# Patient Record
Sex: FEMALE | Race: WHITE | NOT HISPANIC OR LATINO | Employment: STUDENT | URBAN - METROPOLITAN AREA
[De-identification: names, ages, dates, MRNs, and addresses within clinical notes are randomized per-mention and may not be internally consistent; named-entity substitution may affect disease eponyms.]

---
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
## 2022-01-16 ENCOUNTER — HOSPITAL ENCOUNTER (EMERGENCY)
Facility: HOSPITAL | Age: 22
Discharge: HOME | End: 2022-01-16
Attending: EMERGENCY MEDICINE
Payer: MEDICAID

## 2022-01-16 VITALS
WEIGHT: 224.88 LBS | BODY MASS INDEX: 38.6 KG/M2 | DIASTOLIC BLOOD PRESSURE: 73 MMHG | OXYGEN SATURATION: 98 % | SYSTOLIC BLOOD PRESSURE: 125 MMHG | TEMPERATURE: 100 F | RESPIRATION RATE: 21 BRPM | HEART RATE: 116 BPM

## 2022-01-16 DIAGNOSIS — U07.1 COVID-19 VIRUS INFECTION: Primary | ICD-10-CM

## 2022-01-16 LAB
ALBUMIN SERPL BCP-MCNC: 3.6 G/DL (ref 3.5–5.2)
ALP SERPL-CCNC: 54 U/L (ref 55–135)
ALT SERPL W/O P-5'-P-CCNC: 16 U/L (ref 10–44)
ANION GAP SERPL CALC-SCNC: 12 MMOL/L (ref 8–16)
AST SERPL-CCNC: 18 U/L (ref 10–40)
BASOPHILS # BLD AUTO: 0.04 K/UL (ref 0–0.2)
BASOPHILS NFR BLD: 0.6 % (ref 0–1.9)
BILIRUB SERPL-MCNC: 0.3 MG/DL (ref 0.1–1)
BUN SERPL-MCNC: 9 MG/DL (ref 6–20)
CALCIUM SERPL-MCNC: 9.2 MG/DL (ref 8.7–10.5)
CHLORIDE SERPL-SCNC: 103 MMOL/L (ref 95–110)
CK SERPL-CCNC: 100 U/L (ref 20–180)
CO2 SERPL-SCNC: 22 MMOL/L (ref 23–29)
CREAT SERPL-MCNC: 1 MG/DL (ref 0.5–1.4)
CRP SERPL-MCNC: 28.7 MG/L (ref 0–8.2)
CTP QC/QA: YES
DIFFERENTIAL METHOD: NORMAL
EOSINOPHIL # BLD AUTO: 0.1 K/UL (ref 0–0.5)
EOSINOPHIL NFR BLD: 1.9 % (ref 0–8)
ERYTHROCYTE [DISTWIDTH] IN BLOOD BY AUTOMATED COUNT: 11.6 % (ref 11.5–14.5)
EST. GFR  (AFRICAN AMERICAN): >60 ML/MIN/1.73 M^2
EST. GFR  (NON AFRICAN AMERICAN): >60 ML/MIN/1.73 M^2
FERRITIN SERPL-MCNC: 44 NG/ML (ref 20–300)
GLUCOSE SERPL-MCNC: 113 MG/DL (ref 70–110)
HCT VFR BLD AUTO: 41.1 % (ref 37–48.5)
HGB BLD-MCNC: 13.9 G/DL (ref 12–16)
IMM GRANULOCYTES # BLD AUTO: 0.02 K/UL (ref 0–0.04)
IMM GRANULOCYTES NFR BLD AUTO: 0.3 % (ref 0–0.5)
LACTATE SERPL-SCNC: 1.1 MMOL/L (ref 0.5–2.2)
LDH SERPL L TO P-CCNC: 177 U/L (ref 110–260)
LYMPHOCYTES # BLD AUTO: 1.4 K/UL (ref 1–4.8)
LYMPHOCYTES NFR BLD: 18.9 % (ref 18–48)
MCH RBC QN AUTO: 29.2 PG (ref 27–31)
MCHC RBC AUTO-ENTMCNC: 33.8 G/DL (ref 32–36)
MCV RBC AUTO: 86 FL (ref 82–98)
MONOCYTES # BLD AUTO: 1 K/UL (ref 0.3–1)
MONOCYTES NFR BLD: 14.1 % (ref 4–15)
NEUTROPHILS # BLD AUTO: 4.6 K/UL (ref 1.8–7.7)
NEUTROPHILS NFR BLD: 64.2 % (ref 38–73)
NRBC BLD-RTO: 0 /100 WBC
PLATELET # BLD AUTO: 340 K/UL (ref 150–450)
PMV BLD AUTO: 9.7 FL (ref 9.2–12.9)
POTASSIUM SERPL-SCNC: 3.6 MMOL/L (ref 3.5–5.1)
PROCALCITONIN SERPL IA-MCNC: 0.05 NG/ML
PROT SERPL-MCNC: 7.3 G/DL (ref 6–8.4)
RBC # BLD AUTO: 4.76 M/UL (ref 4–5.4)
SARS-COV-2 RDRP RESP QL NAA+PROBE: POSITIVE
SODIUM SERPL-SCNC: 137 MMOL/L (ref 136–145)
TROPONIN I SERPL DL<=0.01 NG/ML-MCNC: <0.006 NG/ML (ref 0–0.03)
WBC # BLD AUTO: 7.23 K/UL (ref 3.9–12.7)

## 2022-01-16 PROCEDURE — 82728 ASSAY OF FERRITIN: CPT | Performed by: EMERGENCY MEDICINE

## 2022-01-16 PROCEDURE — 84484 ASSAY OF TROPONIN QUANT: CPT | Mod: ER | Performed by: EMERGENCY MEDICINE

## 2022-01-16 PROCEDURE — 25000003 PHARM REV CODE 250: Mod: ER | Performed by: EMERGENCY MEDICINE

## 2022-01-16 PROCEDURE — 86140 C-REACTIVE PROTEIN: CPT | Mod: ER | Performed by: EMERGENCY MEDICINE

## 2022-01-16 PROCEDURE — 99284 EMERGENCY DEPT VISIT MOD MDM: CPT | Mod: 25,ER

## 2022-01-16 PROCEDURE — 85025 COMPLETE CBC W/AUTO DIFF WBC: CPT | Mod: ER | Performed by: EMERGENCY MEDICINE

## 2022-01-16 PROCEDURE — 96360 HYDRATION IV INFUSION INIT: CPT | Mod: ER

## 2022-01-16 PROCEDURE — 84145 PROCALCITONIN (PCT): CPT | Mod: ER | Performed by: EMERGENCY MEDICINE

## 2022-01-16 PROCEDURE — 83615 LACTATE (LD) (LDH) ENZYME: CPT | Mod: ER | Performed by: EMERGENCY MEDICINE

## 2022-01-16 PROCEDURE — U0002 COVID-19 LAB TEST NON-CDC: HCPCS | Mod: ER | Performed by: EMERGENCY MEDICINE

## 2022-01-16 PROCEDURE — 80053 COMPREHEN METABOLIC PANEL: CPT | Mod: ER | Performed by: EMERGENCY MEDICINE

## 2022-01-16 PROCEDURE — 82550 ASSAY OF CK (CPK): CPT | Mod: ER | Performed by: EMERGENCY MEDICINE

## 2022-01-16 PROCEDURE — 83605 ASSAY OF LACTIC ACID: CPT | Mod: ER | Performed by: EMERGENCY MEDICINE

## 2022-01-16 RX ORDER — ACETAMINOPHEN 325 MG/1
650 TABLET ORAL
Status: COMPLETED | OUTPATIENT
Start: 2022-01-16 | End: 2022-01-16

## 2022-01-16 RX ADMIN — ACETAMINOPHEN 650 MG: 325 TABLET ORAL at 01:01

## 2022-01-16 RX ADMIN — SODIUM CHLORIDE 500 ML: 0.9 INJECTION, SOLUTION INTRAVENOUS at 12:01

## 2022-01-16 NOTE — Clinical Note
"Keenan "Skinny Marvin was seen and treated in our emergency department on 1/16/2022.  She may return to work on 01/24/2022.       If you have any questions or concerns, please don't hesitate to call.      Luis Regalado MD"

## 2022-01-16 NOTE — ED PROVIDER NOTES
Encounter Date: 1/16/2022       History     Chief Complaint   Patient presents with    COVID-19 Concerns     States headache, coughing, runny nose, fever 99, body aches     Chief complaint: Fever body aches    History of present illness:  21-year-old female presents with an onset today of body aches, runny nose, cough, headache and fever 99. Patient states she was well yesterday.  Went to work today and took some Mucinex XD with minimal relief.  Symptom severity is moderate at present.  Denies any shortness of breath.  States she has a very nonproductive cough.  No aggravating or mitigating factors.  No known exposure to COVID-19 or influenza.  No complaints of dysuria.  No complaints of nausea vomiting or diarrhea.  Denies any loss of taste or smell.          Review of patient's allergies indicates:   Allergen Reactions    Advair diskus [fluticasone propion-salmeterol]      Past Medical History:   Diagnosis Date    ADHD (attention deficit hyperactivity disorder)     Asthma     ODD (oppositional defiant disorder)      Past Surgical History:   Procedure Laterality Date    TYMPANOSTOMY TUBE PLACEMENT       History reviewed. No pertinent family history.  Social History     Tobacco Use    Smoking status: Never Smoker    Smokeless tobacco: Never Used   Substance Use Topics    Alcohol use: Yes     Comment: occas    Drug use: No     Review of Systems   Constitutional: Positive for chills and fever. Negative for activity change, appetite change and diaphoresis.   HENT: Positive for congestion, rhinorrhea and sore throat.    Eyes: Negative.  Negative for photophobia.   Respiratory: Positive for cough. Negative for shortness of breath.    Cardiovascular: Negative for chest pain and palpitations.   Gastrointestinal: Negative for abdominal pain, nausea and vomiting.   Genitourinary: Negative.  Negative for dysuria and flank pain.   Musculoskeletal: Positive for myalgias. Negative for arthralgias, back pain, gait  problem, neck pain and neck stiffness.   Skin: Negative.    Neurological: Negative.    Psychiatric/Behavioral: Negative.    All other systems reviewed and are negative.      Physical Exam     Initial Vitals [01/16/22 0005]   BP Pulse Resp Temp SpO2   (!) 142/81 (!) 133 (!) 22 (!) 100.6 °F (38.1 °C) 97 %      MAP       --         Physical Exam    Nursing note and vitals reviewed.  Constitutional: She appears well-developed and well-nourished. No distress.   HENT:   Head: Normocephalic and atraumatic.   Right Ear: External ear normal.   Left Ear: External ear normal.   Nose: Nose normal.   Mouth/Throat: Oropharynx is clear and moist.   Eyes: Conjunctivae and EOM are normal. No scleral icterus.   Neck: Neck supple.   Normal range of motion.  Cardiovascular: Regular rhythm and intact distal pulses.   Tachycardia rate 128   Pulmonary/Chest: Breath sounds normal. No respiratory distress. She has no wheezes. She has no rales.   Abdominal: Abdomen is soft. She exhibits no distension. There is no abdominal tenderness.   Musculoskeletal:         General: Normal range of motion.      Cervical back: Normal range of motion and neck supple.     Neurological: She is alert and oriented to person, place, and time. She has normal strength. GCS score is 15. GCS eye subscore is 4. GCS verbal subscore is 5. GCS motor subscore is 6.   Skin: Skin is warm and dry. Capillary refill takes less than 2 seconds.   Psychiatric: She has a normal mood and affect. Her behavior is normal.         ED Course   Procedures  Labs Reviewed   COMPREHENSIVE METABOLIC PANEL - Abnormal; Notable for the following components:       Result Value    CO2 22 (*)     Glucose 113 (*)     Alkaline Phosphatase 54 (*)     All other components within normal limits   C-REACTIVE PROTEIN - Abnormal; Notable for the following components:    CRP 28.7 (*)     All other components within normal limits   SARS-COV-2 RDRP GENE - Abnormal; Notable for the following components:     POC Rapid COVID Positive (*)     All other components within normal limits    Narrative:     This test utilizes isothermal nucleic acid amplification   technology to detect the SARS-CoV-2 RdRp nucleic acid segment.   The analytical sensitivity (limit of detection) is 125 genome   equivalents/mL.   A POSITIVE result implies infection with the SARS-CoV-2 virus;   the patient is presumed to be contagious.     A NEGATIVE result means that SARS-CoV-2 nucleic acids are not   present above the limit of detection. A NEGATIVE result should be   treated as presumptive. It does not rule out the possibility of   COVID-19 and should not be the sole basis for treatment decisions.   If COVID-19 is strongly suspected based on clinical and exposure   history, re-testing using an alternate molecular assay should be   considered.   This test is only for use under the Food and Drug   Administration s Emergency Use Authorization (EUA).   Commercial kits are provided by [employer].   Performance characteristics of the EUA have been independently   verified by [facility] Department of   Pathology and Laboratory Medicine.   _________________________________________________________________   The authorized Fact Sheet for Healthcare Providers and the authorized Fact   Sheet for Patients of the ID NOW COVID-19 are available on the FDA   website:     https://www.fda.gov/media/353530/download  https://www.fda.gov/media/036447/download       CBC W/ AUTO DIFFERENTIAL   LACTATE DEHYDROGENASE   CK   LACTIC ACID, PLASMA   TROPONIN I   FERRITIN   PROCALCITONIN            Imaging Results          X-Ray Chest AP Portable (Final result)  Result time 01/16/22 07:08:28    Final result by Miguel Angel Tomas MD (01/16/22 07:08:28)                 Impression:      No acute abnormality.      Electronically signed by: Miguel Angel Tomas  Date:    01/16/2022  Time:    07:08             Narrative:    EXAMINATION:  XR CHEST AP PORTABLE    CLINICAL  HISTORY:  COVID-19;    TECHNIQUE:  Single frontal view of the chest was performed.    COMPARISON:  None    FINDINGS:  External cardiac leads overlie the chest peerthe lungs are clear, with normal appearance of pulmonary vasculature and no pleural effusion or pneumothorax.    The cardiac silhouette is normal in size. The hilar and mediastinal contours are unremarkable.    Bones are intact.                                   Medications   sodium chloride 0.9% bolus 500 mL (has no administration in time range)     Medical Decision Making:   Clinical Tests:   Lab Tests: Ordered and Reviewed  Radiological Study: Ordered and Reviewed  ED Management:  Pt with good response to IV hydration and temp reduction.Advised of +Covid findings and need to follow instructions provided to her for quarantining and subsequent contuance of masking and social distancing. She understands                      Clinical Impression:        ICD-10-CM ICD-9-CM   1. COVID-19 virus infection  U07.1 079.89               Luis Regalado MD  01/16/22 1127

## 2022-08-31 ENCOUNTER — PATIENT MESSAGE (OUTPATIENT)
Dept: PRIMARY CARE CLINIC | Facility: CLINIC | Age: 22
End: 2022-08-31
Payer: MEDICAID

## 2022-09-26 ENCOUNTER — OFFICE VISIT (OUTPATIENT)
Dept: PRIMARY CARE CLINIC | Facility: CLINIC | Age: 22
End: 2022-09-26
Payer: MEDICAID

## 2022-09-26 VITALS
OXYGEN SATURATION: 100 % | DIASTOLIC BLOOD PRESSURE: 64 MMHG | HEART RATE: 96 BPM | WEIGHT: 225.19 LBS | BODY MASS INDEX: 38.44 KG/M2 | SYSTOLIC BLOOD PRESSURE: 118 MMHG | TEMPERATURE: 99 F | HEIGHT: 64 IN

## 2022-09-26 DIAGNOSIS — T78.2XXD ANAPHYLAXIS, SUBSEQUENT ENCOUNTER: ICD-10-CM

## 2022-09-26 DIAGNOSIS — L50.9 URTICARIA: Primary | ICD-10-CM

## 2022-09-26 PROCEDURE — 99204 PR OFFICE/OUTPT VISIT, NEW, LEVL IV, 45-59 MIN: ICD-10-PCS | Mod: S$PBB,,, | Performed by: FAMILY MEDICINE

## 2022-09-26 PROCEDURE — 3078F DIAST BP <80 MM HG: CPT | Mod: CPTII,,, | Performed by: FAMILY MEDICINE

## 2022-09-26 PROCEDURE — 3074F PR MOST RECENT SYSTOLIC BLOOD PRESSURE < 130 MM HG: ICD-10-PCS | Mod: CPTII,,, | Performed by: FAMILY MEDICINE

## 2022-09-26 PROCEDURE — 99214 OFFICE O/P EST MOD 30 MIN: CPT | Mod: PBBFAC,PN | Performed by: FAMILY MEDICINE

## 2022-09-26 PROCEDURE — 1159F MED LIST DOCD IN RCRD: CPT | Mod: CPTII,,, | Performed by: FAMILY MEDICINE

## 2022-09-26 PROCEDURE — 3008F BODY MASS INDEX DOCD: CPT | Mod: CPTII,,, | Performed by: FAMILY MEDICINE

## 2022-09-26 PROCEDURE — 3074F SYST BP LT 130 MM HG: CPT | Mod: CPTII,,, | Performed by: FAMILY MEDICINE

## 2022-09-26 PROCEDURE — 1160F RVW MEDS BY RX/DR IN RCRD: CPT | Mod: CPTII,,, | Performed by: FAMILY MEDICINE

## 2022-09-26 PROCEDURE — 99999 PR PBB SHADOW E&M-EST. PATIENT-LVL IV: ICD-10-PCS | Mod: PBBFAC,,, | Performed by: FAMILY MEDICINE

## 2022-09-26 PROCEDURE — 1160F PR REVIEW ALL MEDS BY PRESCRIBER/CLIN PHARMACIST DOCUMENTED: ICD-10-PCS | Mod: CPTII,,, | Performed by: FAMILY MEDICINE

## 2022-09-26 PROCEDURE — 1159F PR MEDICATION LIST DOCUMENTED IN MEDICAL RECORD: ICD-10-PCS | Mod: CPTII,,, | Performed by: FAMILY MEDICINE

## 2022-09-26 PROCEDURE — 99204 OFFICE O/P NEW MOD 45 MIN: CPT | Mod: S$PBB,,, | Performed by: FAMILY MEDICINE

## 2022-09-26 PROCEDURE — 3078F PR MOST RECENT DIASTOLIC BLOOD PRESSURE < 80 MM HG: ICD-10-PCS | Mod: CPTII,,, | Performed by: FAMILY MEDICINE

## 2022-09-26 PROCEDURE — 3008F PR BODY MASS INDEX (BMI) DOCUMENTED: ICD-10-PCS | Mod: CPTII,,, | Performed by: FAMILY MEDICINE

## 2022-09-26 PROCEDURE — 99999 PR PBB SHADOW E&M-EST. PATIENT-LVL IV: CPT | Mod: PBBFAC,,, | Performed by: FAMILY MEDICINE

## 2022-09-26 RX ORDER — FAMOTIDINE 20 MG/1
20 TABLET, FILM COATED ORAL 2 TIMES DAILY PRN
Qty: 30 TABLET | Refills: 0 | Status: SHIPPED | OUTPATIENT
Start: 2022-09-26 | End: 2023-01-30

## 2022-09-26 RX ORDER — EPINEPHRINE 0.3 MG/.3ML
1 INJECTION SUBCUTANEOUS ONCE
Qty: 2 EACH | Refills: 0 | Status: SHIPPED | OUTPATIENT
Start: 2022-09-26 | End: 2022-09-26

## 2022-09-26 RX ORDER — HYDROXYZINE HYDROCHLORIDE 25 MG/1
25 TABLET, FILM COATED ORAL EVERY 6 HOURS PRN
Qty: 30 TABLET | Refills: 0 | Status: SHIPPED | OUTPATIENT
Start: 2022-09-26 | End: 2022-10-26

## 2022-09-26 RX ORDER — CETIRIZINE HYDROCHLORIDE 10 MG/1
10 TABLET ORAL DAILY
Qty: 30 TABLET | Refills: 0 | Status: SHIPPED | OUTPATIENT
Start: 2022-09-26 | End: 2023-01-30

## 2022-09-26 NOTE — PROGRESS NOTES
Subjective:       Patient ID: Keenan Marvin is a 22 y.o. female.    Chief Complaint: Establish Care (Allergic reaction to foods /est care )      HPI   History of Present Illness:   Keenan Marvin 22 y.o. female presents today with  Here with grandmother  History of Present Illness: The patient presents with chief complaint of urticaria  Sxs include, lips swelling, hoarseness, whelps to arms, back and face  Duration: days, recurrent, sxs is present to the back today.  Thinks it is food related but unable to any particular food. Worsening frequency.  Treatment: Hydroxyzine from friend, benadryl.     Past Medical History:   Diagnosis Date    ADHD (attention deficit hyperactivity disorder)     Asthma     ODD (oppositional defiant disorder)      History reviewed. No pertinent family history.  Social History     Socioeconomic History    Marital status: Single   Tobacco Use    Smoking status: Every Day     Types: Vaping with nicotine    Smokeless tobacco: Never   Substance and Sexual Activity    Alcohol use: Yes     Comment: occas    Drug use: No    Sexual activity: Yes     Partners: Male     Birth control/protection: OCP     Outpatient Encounter Medications as of 9/26/2022   Medication Sig Dispense Refill    desog-e.estradioL/e.estradioL (VIORELE, 28,) 0.15-0.02 mgx21 /0.01 mg x 5 per tablet Take 1 tablet by mouth once daily. 28 tablet 11    EPINEPHrine (EPIPEN 2-OLIVER) 0.3 mg/0.3 mL AtIn Inject 0.3 mLs (0.3 mg total) into the muscle once. for 1 dose 2 each 0    lisdexamfetamine dimesylate (VYVANSE ORAL) Vyvanse Take No date recorded No form recorded No frequency recorded No route recorded No set duration recorded No set duration amount recorded suspended No dosage strength recorded No dosage strength units of measure recorded      [DISCONTINUED] atomoxetine (STRATTERA) 40 MG capsule Take 40 mg by mouth once daily.      [DISCONTINUED] cetirizine 10 mg chewable tablet Take 10 mg by mouth once daily.       No  "facility-administered encounter medications on file as of 9/26/2022.       Review of Systems    Review of Systems      A complete 10 point ROS was completed and are positive as per above HPI.    Otherwise negative for fever, diplopia, chest pain, shortness of breath, vomiting, blood in urine, joint pain, seizures and unusual bleeding.       Objective:      /64 (BP Location: Left arm, Patient Position: Sitting, BP Method: Large (Manual))   Pulse 96   Temp 98.8 °F (37.1 °C) (Temporal)   Ht 5' 4" (1.626 m)   Wt 102.2 kg (225 lb 3.2 oz)   SpO2 100%   BMI 38.66 kg/m²   Physical Exam  Skin:     Findings: Rash present. Rash is urticarial.                Results for orders placed or performed during the hospital encounter of 01/16/22   CBC auto differential   Result Value Ref Range    WBC 7.23 3.90 - 12.70 K/uL    RBC 4.76 4.00 - 5.40 M/uL    Hemoglobin 13.9 12.0 - 16.0 g/dL    Hematocrit 41.1 37.0 - 48.5 %    MCV 86 82 - 98 fL    MCH 29.2 27.0 - 31.0 pg    MCHC 33.8 32.0 - 36.0 g/dL    RDW 11.6 11.5 - 14.5 %    Platelets 340 150 - 450 K/uL    MPV 9.7 9.2 - 12.9 fL    Immature Granulocytes 0.3 0.0 - 0.5 %    Gran # (ANC) 4.6 1.8 - 7.7 K/uL    Immature Grans (Abs) 0.02 0.00 - 0.04 K/uL    Lymph # 1.4 1.0 - 4.8 K/uL    Mono # 1.0 0.3 - 1.0 K/uL    Eos # 0.1 0.0 - 0.5 K/uL    Baso # 0.04 0.00 - 0.20 K/uL    nRBC 0 0 /100 WBC    Gran % 64.2 38.0 - 73.0 %    Lymph % 18.9 18.0 - 48.0 %    Mono % 14.1 4.0 - 15.0 %    Eosinophil % 1.9 0.0 - 8.0 %    Basophil % 0.6 0.0 - 1.9 %    Differential Method Automated    Comprehensive metabolic panel   Result Value Ref Range    Sodium 137 136 - 145 mmol/L    Potassium 3.6 3.5 - 5.1 mmol/L    Chloride 103 95 - 110 mmol/L    CO2 22 (L) 23 - 29 mmol/L    Glucose 113 (H) 70 - 110 mg/dL    BUN 9 6 - 20 mg/dL    Creatinine 1.0 0.5 - 1.4 mg/dL    Calcium 9.2 8.7 - 10.5 mg/dL    Total Protein 7.3 6.0 - 8.4 g/dL    Albumin 3.6 3.5 - 5.2 g/dL    Total Bilirubin 0.3 0.1 - 1.0 mg/dL    " Alkaline Phosphatase 54 (L) 55 - 135 U/L    AST 18 10 - 40 U/L    ALT 16 10 - 44 U/L    Anion Gap 12 8 - 16 mmol/L    eGFR if African American >60.0 >60 mL/min/1.73 m^2    eGFR if non African American >60.0 >60 mL/min/1.73 m^2   C-Reactive Protein   Result Value Ref Range    CRP 28.7 (H) 0.0 - 8.2 mg/L   Ferritin   Result Value Ref Range    Ferritin 44 20.0 - 300.0 ng/mL   Lactate Dehydrogenase   Result Value Ref Range     110 - 260 U/L   CK   Result Value Ref Range     20 - 180 U/L   Lactic Acid, Plasma   Result Value Ref Range    Lactate (Lactic Acid) 1.1 0.5 - 2.2 mmol/L   Troponin I   Result Value Ref Range    Troponin I <0.006 0.000 - 0.026 ng/mL   Procalcitonin   Result Value Ref Range    Procalcitonin 0.05 <0.25 ng/mL   POCT COVID-19 Rapid Screening   Result Value Ref Range    POC Rapid COVID Positive (A) Negative     Acceptable Yes      Assessment:       1. Urticaria    2. Anaphylaxis, subsequent encounter          Plan:   Urticaria  -     Ambulatory referral/consult to Allergy; Future; Expected date: 10/03/2022    Anaphylaxis, subsequent encounter  -     EPINEPHrine (EPIPEN 2-OLIVER) 0.3 mg/0.3 mL AtIn; Inject 0.3 mLs (0.3 mg total) into the muscle once. for 1 dose  Dispense: 2 each; Refill: 0    I have reviewed all of the patient's clinical history available in care everywhere and Epic and have utilized this in my evaluation and management recommendations today.   Urticaria, suspect hoarseness to be due to mucosal edema, etiology unknown but epi pen prescribed as a precaution. Instructed to inject if swelling of the tongue or diff breathing.    Treatment options and alternatives were discussed with the patient. Patient was given ample time to ask questions. All questions were answered. Voices understanding and acceptance of this advice. Will call back if any further questions or concerns.      Lida Carr MD

## 2024-09-06 ENCOUNTER — HOSPITAL ENCOUNTER (EMERGENCY)
Facility: HOSPITAL | Age: 24
Discharge: HOME | End: 2024-09-07
Attending: EMERGENCY MEDICINE
Payer: MEDICAID

## 2024-09-06 VITALS
HEIGHT: 63 IN | OXYGEN SATURATION: 100 % | BODY MASS INDEX: 40.69 KG/M2 | DIASTOLIC BLOOD PRESSURE: 85 MMHG | RESPIRATION RATE: 20 BRPM | TEMPERATURE: 98 F | HEART RATE: 92 BPM | WEIGHT: 229.63 LBS | SYSTOLIC BLOOD PRESSURE: 163 MMHG

## 2024-09-06 DIAGNOSIS — S39.012A BACK STRAIN, INITIAL ENCOUNTER: Primary | ICD-10-CM

## 2024-09-06 DIAGNOSIS — M62.830 BACK SPASM: ICD-10-CM

## 2024-09-06 DIAGNOSIS — R10.9 LEFT FLANK PAIN: ICD-10-CM

## 2024-09-06 LAB
ALBUMIN SERPL BCP-MCNC: 4 G/DL (ref 3.5–5.2)
ALP SERPL-CCNC: 61 U/L (ref 55–135)
ALT SERPL W/O P-5'-P-CCNC: 12 U/L (ref 10–44)
ANION GAP SERPL CALC-SCNC: 11 MMOL/L (ref 8–16)
AST SERPL-CCNC: 13 U/L (ref 10–40)
B-HCG UR QL: NEGATIVE
BACTERIA #/AREA URNS AUTO: ABNORMAL /HPF
BASOPHILS # BLD AUTO: 0.08 K/UL (ref 0–0.2)
BASOPHILS NFR BLD: 0.7 % (ref 0–1.9)
BILIRUB SERPL-MCNC: 0.3 MG/DL (ref 0.1–1)
BILIRUB UR QL STRIP: NEGATIVE
BUN SERPL-MCNC: 11 MG/DL (ref 6–20)
CALCIUM SERPL-MCNC: 9.4 MG/DL (ref 8.7–10.5)
CHLORIDE SERPL-SCNC: 106 MMOL/L (ref 95–110)
CLARITY UR REFRACT.AUTO: ABNORMAL
CO2 SERPL-SCNC: 23 MMOL/L (ref 23–29)
COLOR UR AUTO: YELLOW
CREAT SERPL-MCNC: 0.9 MG/DL (ref 0.5–1.4)
DIFFERENTIAL METHOD BLD: ABNORMAL
EOSINOPHIL # BLD AUTO: 0.1 K/UL (ref 0–0.5)
EOSINOPHIL NFR BLD: 0.9 % (ref 0–8)
ERYTHROCYTE [DISTWIDTH] IN BLOOD BY AUTOMATED COUNT: 12.3 % (ref 11.5–14.5)
EST. GFR  (NO RACE VARIABLE): >60 ML/MIN/1.73 M^2
GLUCOSE SERPL-MCNC: 97 MG/DL (ref 70–110)
GLUCOSE UR QL STRIP: NEGATIVE
HCT VFR BLD AUTO: 44.8 % (ref 37–48.5)
HGB BLD-MCNC: 15.4 G/DL (ref 12–16)
HGB UR QL STRIP: NEGATIVE
IMM GRANULOCYTES # BLD AUTO: 0.02 K/UL (ref 0–0.04)
IMM GRANULOCYTES NFR BLD AUTO: 0.2 % (ref 0–0.5)
KETONES UR QL STRIP: NEGATIVE
LEUKOCYTE ESTERASE UR QL STRIP: NEGATIVE
LIPASE SERPL-CCNC: 30 U/L (ref 4–60)
LYMPHOCYTES # BLD AUTO: 4 K/UL (ref 1–4.8)
LYMPHOCYTES NFR BLD: 34.6 % (ref 18–48)
MCH RBC QN AUTO: 29.3 PG (ref 27–31)
MCHC RBC AUTO-ENTMCNC: 34.4 G/DL (ref 32–36)
MCV RBC AUTO: 85 FL (ref 82–98)
MICROSCOPIC COMMENT: ABNORMAL
MONOCYTES # BLD AUTO: 0.7 K/UL (ref 0.3–1)
MONOCYTES NFR BLD: 6.1 % (ref 4–15)
NEUTROPHILS # BLD AUTO: 6.7 K/UL (ref 1.8–7.7)
NEUTROPHILS NFR BLD: 57.5 % (ref 38–73)
NITRITE UR QL STRIP: NEGATIVE
NRBC BLD-RTO: 0 /100 WBC
PH UR STRIP: 8 [PH] (ref 5–8)
PLATELET # BLD AUTO: 452 K/UL (ref 150–450)
PMV BLD AUTO: 9.8 FL (ref 9.2–12.9)
POTASSIUM SERPL-SCNC: 3.5 MMOL/L (ref 3.5–5.1)
PROT SERPL-MCNC: 7.7 G/DL (ref 6–8.4)
PROT UR QL STRIP: NEGATIVE
RBC # BLD AUTO: 5.26 M/UL (ref 4–5.4)
RBC #/AREA URNS AUTO: 1 /HPF (ref 0–4)
SODIUM SERPL-SCNC: 140 MMOL/L (ref 136–145)
SP GR UR STRIP: 1.02 (ref 1–1.03)
SQUAMOUS #/AREA URNS AUTO: 4 /HPF
URN SPEC COLLECT METH UR: ABNORMAL
UROBILINOGEN UR STRIP-ACNC: <2 EU/DL
WBC # BLD AUTO: 11.68 K/UL (ref 3.9–12.7)
WBC #/AREA URNS AUTO: 1 /HPF (ref 0–5)

## 2024-09-06 PROCEDURE — 81025 URINE PREGNANCY TEST: CPT | Mod: ER | Performed by: EMERGENCY MEDICINE

## 2024-09-06 PROCEDURE — 83690 ASSAY OF LIPASE: CPT | Mod: ER | Performed by: EMERGENCY MEDICINE

## 2024-09-06 PROCEDURE — 85025 COMPLETE CBC W/AUTO DIFF WBC: CPT | Mod: ER | Performed by: EMERGENCY MEDICINE

## 2024-09-06 PROCEDURE — 63600175 PHARM REV CODE 636 W HCPCS: Mod: ER | Performed by: EMERGENCY MEDICINE

## 2024-09-06 PROCEDURE — 96361 HYDRATE IV INFUSION ADD-ON: CPT | Mod: ER

## 2024-09-06 PROCEDURE — 80053 COMPREHEN METABOLIC PANEL: CPT | Mod: ER | Performed by: EMERGENCY MEDICINE

## 2024-09-06 PROCEDURE — 99285 EMERGENCY DEPT VISIT HI MDM: CPT | Mod: 25,ER

## 2024-09-06 PROCEDURE — 25000003 PHARM REV CODE 250: Mod: ER | Performed by: EMERGENCY MEDICINE

## 2024-09-06 PROCEDURE — 96374 THER/PROPH/DIAG INJ IV PUSH: CPT | Mod: ER

## 2024-09-06 PROCEDURE — 81000 URINALYSIS NONAUTO W/SCOPE: CPT | Mod: ER | Performed by: EMERGENCY MEDICINE

## 2024-09-06 RX ORDER — HYDROXYZINE PAMOATE 25 MG/1
25 CAPSULE ORAL
COMMUNITY
Start: 2024-06-01

## 2024-09-06 RX ORDER — METHOCARBAMOL 500 MG/1
500 TABLET, FILM COATED ORAL 3 TIMES DAILY
COMMUNITY
Start: 2024-09-06

## 2024-09-06 RX ORDER — DEXBROMPHENIRAMINE MALEATE, PHENYLEPHRINE HYDROCHLORIDE 2; 7.5 MG/1; MG/1
1 TABLET ORAL 3 TIMES DAILY
COMMUNITY
Start: 2024-04-11

## 2024-09-06 RX ORDER — HYDROCHLOROTHIAZIDE 25 MG/1
TABLET ORAL
COMMUNITY
Start: 2024-08-07

## 2024-09-06 RX ORDER — KETOROLAC TROMETHAMINE 30 MG/ML
30 INJECTION, SOLUTION INTRAMUSCULAR; INTRAVENOUS
Status: COMPLETED | OUTPATIENT
Start: 2024-09-06 | End: 2024-09-06

## 2024-09-06 RX ORDER — NAPROXEN 500 MG/1
500 TABLET ORAL 2 TIMES DAILY
COMMUNITY
Start: 2024-09-06

## 2024-09-06 RX ADMIN — SODIUM CHLORIDE 1000 ML: 9 INJECTION, SOLUTION INTRAVENOUS at 10:09

## 2024-09-06 RX ADMIN — KETOROLAC TROMETHAMINE 30 MG: 30 INJECTION, SOLUTION INTRAMUSCULAR at 10:09

## 2024-09-07 NOTE — ED PROVIDER NOTES
Encounter Date: 9/6/2024       History     Chief Complaint   Patient presents with    Flank Pain     C/o right sided flank pain that started 2 days ago w/ stabbing and tightness that started today. Denies nausea or vomiting. States burning w/ urination.      The history is provided by the patient.   Abdominal Pain  The current episode started today. The onset of the illness was gradual. The problem has not changed since onset.The abdominal pain is located in the left flank. The abdominal pain does not radiate. Pain scale: moderate. The abdominal pain is relieved by nothing. The abdominal pain is exacerbated by movement. The other symptoms of the illness do not include fever, fatigue, jaundice, melena, shortness of breath, nausea, vomiting, diarrhea, dysuria, hematemesis, hematochezia, vaginal discharge or vaginal bleeding.   The patient states that she believes she is currently not pregnant. The patient has not had a change in bowel habit. Additional symptoms associated with the illness include back pain (left lower back pain). Symptoms associated with the illness do not include chills, anorexia, diaphoresis, heartburn, constipation, urgency, hematuria or frequency.     Review of patient's allergies indicates:   Allergen Reactions    Advair diskus [fluticasone propion-salmeterol]      Past Medical History:   Diagnosis Date    ADHD (attention deficit hyperactivity disorder)     Asthma     ODD (oppositional defiant disorder)      Past Surgical History:   Procedure Laterality Date    TYMPANOSTOMY TUBE PLACEMENT      WISDOM TOOTH EXTRACTION       No family history on file.  Social History     Tobacco Use    Smoking status: Every Day     Types: Vaping with nicotine    Smokeless tobacco: Never   Substance Use Topics    Alcohol use: Yes     Comment: occas    Drug use: No     Review of Systems   Constitutional:  Negative for chills, diaphoresis, fatigue and fever.   HENT:  Negative for sore throat.    Respiratory:  Negative  for shortness of breath.    Cardiovascular:  Negative for chest pain.   Gastrointestinal:  Positive for abdominal pain. Negative for anorexia, constipation, diarrhea, heartburn, hematemesis, hematochezia, jaundice, melena, nausea and vomiting.   Genitourinary:  Negative for dysuria, frequency, hematuria, urgency, vaginal bleeding and vaginal discharge.   Musculoskeletal:  Positive for back pain (left lower back pain).   Skin:  Negative for rash.   Neurological:  Negative for weakness.   Hematological:  Does not bruise/bleed easily.   All other systems reviewed and are negative.      Physical Exam     Initial Vitals [09/06/24 2131]   BP Pulse Resp Temp SpO2   (!) 158/78 98 20 97.9 °F (36.6 °C) 98 %      MAP       --         Physical Exam    Nursing note and vitals reviewed.  Constitutional: She appears well-developed and well-nourished.   HENT:   Head: Normocephalic and atraumatic.   Mouth/Throat: No oropharyngeal exudate.   Eyes: Conjunctivae and EOM are normal. Pupils are equal, round, and reactive to light.   Neck: Neck supple. No thyromegaly present.   Normal range of motion.  Cardiovascular:  Normal rate, regular rhythm, normal heart sounds and intact distal pulses.     Exam reveals no gallop and no friction rub.       No murmur heard.  Pulmonary/Chest: Breath sounds normal. No respiratory distress. She has no wheezes. She has no rhonchi. She exhibits no tenderness.   Abdominal: Abdomen is soft. Bowel sounds are normal. She exhibits no distension. There is no abdominal tenderness. There is no rebound and no guarding.   Musculoskeletal:         General: No edema. Normal range of motion.      Right wrist: Normal. Normal pulse.      Left wrist: Normal. Normal pulse.      Right hand: Normal. Normal capillary refill. Normal pulse.      Left hand: Normal. Normal capillary refill. Normal pulse.      Cervical back: Normal, normal range of motion and neck supple.      Thoracic back: Normal.      Lumbar back: Spasms and  tenderness (left paraspinous mm ttp) present.        Back:      Lymphadenopathy:     She has no cervical adenopathy.   Neurological: She is alert and oriented to person, place, and time. She has normal strength. No cranial nerve deficit or sensory deficit. She displays a negative Romberg sign. GCS eye subscore is 4. GCS verbal subscore is 5. GCS motor subscore is 6.   No acute focal neuro deficits   Skin: Skin is warm, dry and intact. Capillary refill takes less than 2 seconds. No rash noted.   Psychiatric: She has a normal mood and affect. Her behavior is normal. Judgment and thought content normal.         ED Course   Procedures  Labs Reviewed   CBC W/ AUTO DIFFERENTIAL - Abnormal       Result Value    WBC 11.68      RBC 5.26      Hemoglobin 15.4      Hematocrit 44.8      MCV 85      MCH 29.3      MCHC 34.4      RDW 12.3      Platelets 452 (*)     MPV 9.8      Immature Granulocytes 0.2      Gran # (ANC) 6.7      Immature Grans (Abs) 0.02      Lymph # 4.0      Mono # 0.7      Eos # 0.1      Baso # 0.08      nRBC 0      Gran % 57.5      Lymph % 34.6      Mono % 6.1      Eosinophil % 0.9      Basophil % 0.7      Differential Method Automated     URINALYSIS, REFLEX TO URINE CULTURE - Abnormal    Specimen UA Urine, Clean Catch      Color, UA Yellow      Appearance, UA Cloudy (*)     pH, UA 8.0      Specific Gravity, UA 1.020      Protein, UA Negative      Glucose, UA Negative      Ketones, UA Negative      Bilirubin (UA) Negative      Occult Blood UA Negative      Nitrite, UA Negative      Urobilinogen, UA <2.0      Leukocytes, UA Negative      Narrative:     Specimen Source->Urine   URINALYSIS MICROSCOPIC - Abnormal    RBC, UA 1      WBC, UA 1      Bacteria Few (*)     Squam Epithel, UA 4      Microscopic Comment SEE COMMENT      Narrative:     Specimen Source->Urine   COMPREHENSIVE METABOLIC PANEL    Sodium 140      Potassium 3.5      Chloride 106      CO2 23      Glucose 97      BUN 11      Creatinine 0.9       Calcium 9.4      Total Protein 7.7      Albumin 4.0      Total Bilirubin 0.3      Alkaline Phosphatase 61      AST 13      ALT 12      eGFR >60.0      Anion Gap 11     LIPASE    Lipase 30     PREGNANCY TEST, URINE RAPID    Preg Test, Ur Negative      Narrative:     Specimen Source->Urine          Imaging Results              CT Renal Stone Study ABD Pelvis WO (Final result)  Result time 09/06/24 23:04:55      Final result by José Miguel Norris DO (09/06/24 23:04:55)                   Impression:     No acute findings.    All CT scans at [this location] are performed using dose modulation techniques as appropriate to a performed exam including the following:  Automated exposure control; adjustment of the mA and/or kV according to patient size (this includes techniques or standardized protocols for targeted exams where dose is matched to indication / reason for exam; i.e. extremities or head); use of iterative reconstruction technique.    Finalized on: 9/6/2024 11:04 PM By:  José Miguel Norris  [facility]# [id number]      2024-09-06 23:07:06.084    [facility]               Narrative:    EXAM: CT RENAL STONE STUDY ABD PELVIS WO    CLINICAL HISTORY: Flank pain    COMPARISON: None    TECHNIQUE: Standard thin-section axial images, with reformatted sagittal and coronal images.    FINDINGS: Lung bases are clear.    Within limits of a noncontrast CT, the liver, spleen, pancreas, adrenals and gallbladder are unremarkable.    Kidneys: No hydronephrosis, hydroureter, nephrolithiasis or ureteral stones.    No abdominal or retroperitoneal lymphadenopathy.  No ascites or fat stranding within the abdomen.  No dilatation of the large or small bowel.  No evidence for appendicitis.    Pelvis: Uterus and adnexal regions are unremarkable.  No pelvic free fluid, iliac chain or inguinal lymphadenopathy.    No concerning lytic or sclerotic bony lesions.                                         Medications   sodium chloride 0.9% bolus 1,000 mL 1,000 mL (1,000 mLs  "Intravenous New Bag 9/6/24 2222)   ketorolac injection 30 mg (30 mg Intravenous Given 9/6/24 2222)     Medical Decision Making  Amount and/or Complexity of Data Reviewed  Labs: ordered.  Radiology: ordered.    Risk  Prescription drug management.                  Vitals:    09/06/24 2131 09/06/24 2222 09/06/24 2252 09/06/24 2307   BP: (!) 158/78 (!) 142/77  (!) 163/85   Pulse: 98 91 86 92   Resp: 20 20 20    Temp: 97.9 °F (36.6 °C)      TempSrc: Oral      SpO2: 98% 100%  100%   Weight: 104.2 kg (229 lb 9.8 oz)      Height: 5' 3" (1.6 m)          Results for orders placed or performed during the hospital encounter of 09/06/24   CBC W/ AUTO DIFFERENTIAL   Result Value Ref Range    WBC 11.68 3.90 - 12.70 K/uL    RBC 5.26 4.00 - 5.40 M/uL    Hemoglobin 15.4 12.0 - 16.0 g/dL    Hematocrit 44.8 37.0 - 48.5 %    MCV 85 82 - 98 fL    MCH 29.3 27.0 - 31.0 pg    MCHC 34.4 32.0 - 36.0 g/dL    RDW 12.3 11.5 - 14.5 %    Platelets 452 (H) 150 - 450 K/uL    MPV 9.8 9.2 - 12.9 fL    Immature Granulocytes 0.2 0.0 - 0.5 %    Gran # (ANC) 6.7 1.8 - 7.7 K/uL    Immature Grans (Abs) 0.02 0.00 - 0.04 K/uL    Lymph # 4.0 1.0 - 4.8 K/uL    Mono # 0.7 0.3 - 1.0 K/uL    Eos # 0.1 0.0 - 0.5 K/uL    Baso # 0.08 0.00 - 0.20 K/uL    nRBC 0 0 /100 WBC    Gran % 57.5 38.0 - 73.0 %    Lymph % 34.6 18.0 - 48.0 %    Mono % 6.1 4.0 - 15.0 %    Eosinophil % 0.9 0.0 - 8.0 %    Basophil % 0.7 0.0 - 1.9 %    Differential Method Automated    Comp. Metabolic Panel   Result Value Ref Range    Sodium 140 136 - 145 mmol/L    Potassium 3.5 3.5 - 5.1 mmol/L    Chloride 106 95 - 110 mmol/L    CO2 23 23 - 29 mmol/L    Glucose 97 70 - 110 mg/dL    BUN 11 6 - 20 mg/dL    Creatinine 0.9 0.5 - 1.4 mg/dL    Calcium 9.4 8.7 - 10.5 mg/dL    Total Protein 7.7 6.0 - 8.4 g/dL    Albumin 4.0 3.5 - 5.2 g/dL    Total Bilirubin 0.3 0.1 - 1.0 mg/dL    Alkaline Phosphatase 61 55 - 135 U/L    AST 13 10 - 40 U/L    ALT 12 10 - 44 U/L    eGFR >60.0 >60 mL/min/1.73 m^2    Anion Gap 11 " 8 - 16 mmol/L   Lipase   Result Value Ref Range    Lipase 30 4 - 60 U/L   Urinalysis, Reflex to Urine Culture Urine, Clean Catch    Specimen: Urine   Result Value Ref Range    Specimen UA Urine, Clean Catch     Color, UA Yellow Yellow, Straw, Katina    Appearance, UA Cloudy (A) Clear    pH, UA 8.0 5.0 - 8.0    Specific Gravity, UA 1.020 1.005 - 1.030    Protein, UA Negative Negative    Glucose, UA Negative Negative    Ketones, UA Negative Negative    Bilirubin (UA) Negative Negative    Occult Blood UA Negative Negative    Nitrite, UA Negative Negative    Urobilinogen, UA <2.0 <2.0 EU/dL    Leukocytes, UA Negative Negative   Pregnancy, urine rapid   Result Value Ref Range    Preg Test, Ur Negative    Urinalysis Microscopic   Result Value Ref Range    RBC, UA 1 0 - 4 /hpf    WBC, UA 1 0 - 5 /hpf    Bacteria Few (A) None-Occ /hpf    Squam Epithel, UA 4 /hpf    Microscopic Comment SEE COMMENT          Imaging Results              CT Renal Stone Study ABD Pelvis WO (Final result)  Result time 09/06/24 23:04:55      Final result by José Miguel Norris DO (09/06/24 23:04:55)                   Impression:     No acute findings.    All CT scans at [this location] are performed using dose modulation techniques as appropriate to a performed exam including the following:  Automated exposure control; adjustment of the mA and/or kV according to patient size (this includes techniques or standardized protocols for targeted exams where dose is matched to indication / reason for exam; i.e. extremities or head); use of iterative reconstruction technique.    Finalized on: 9/6/2024 11:04 PM By:  José Miguel Norris  [facility]# [id number]      2024-09-06 23:07:06.084    [facility]               Narrative:    EXAM: CT RENAL STONE STUDY ABD PELVIS WO    CLINICAL HISTORY: Flank pain    COMPARISON: None    TECHNIQUE: Standard thin-section axial images, with reformatted sagittal and coronal images.    FINDINGS: Lung bases are clear.    Within limits of a noncontrast  CT, the liver, spleen, pancreas, adrenals and gallbladder are unremarkable.    Kidneys: No hydronephrosis, hydroureter, nephrolithiasis or ureteral stones.    No abdominal or retroperitoneal lymphadenopathy.  No ascites or fat stranding within the abdomen.  No dilatation of the large or small bowel.  No evidence for appendicitis.    Pelvis: Uterus and adnexal regions are unremarkable.  No pelvic free fluid, iliac chain or inguinal lymphadenopathy.    No concerning lytic or sclerotic bony lesions.                                        Medications   sodium chloride 0.9% bolus 1,000 mL 1,000 mL (1,000 mLs Intravenous New Bag 9/6/24 2222)   ketorolac injection 30 mg (30 mg Intravenous Given 9/6/24 2222)       11:44 PM - Re-evaluation: The patient is resting comfortably and is in no acute distress. She states that her symptoms have improved after treatment within ER. Discussed test results, shared treatment plan, specific conditions for return, and importance of follow up with patient and family.  Advised close f/u c pcp within one week and to return to ER if any issues arise.  She understands and agrees with the plan as discussed. Answered  her questions at this time. She has remained hemodynamically stable throughout the ED course and is appropriate for discharge home.     Take robaxin and naproxen as prescribed.  Return to ER if symptoms persist or worsen.    Regarding BACK PAIN, I advised patient to rest and slowly start to increase activity as pain decreases or as tolerable.  Also recommend the use of ice and heat. Explained to patient that ice will help decrease swelling and pain and prevent tissue damage (verbalized importance of covering ice with a towel and not applying it directly to skin and applying it for 20-30 minutes every 2 hours as needed). Further explained that heat will help decrease pain and muscle spasms (instructed patient to not fall asleep with heating pad and to apply heat to lower back for  20-30 minutes every 2 hours as needed). For prevention, I recommended that the patient use correct body movements (bend at the hips and knees when picking up objects and use leg muscles as you lift the load; keep objects close to chest when lifting it; and avoid twisting or lifting anything above the waist; change position often when standing for long periods of time; never reach, pull, or push while seated; exercise frequently and warm up before exercising; and maintain a healthy weight.  Follow up with primary care provider if no improvement is noticed in next three days.  Take all medications as prescribed and avoid operating heavy machinery or driving if prescribed pain medications or muscle relaxants.  Instructed patient to return to the emergency department if they develop incontinence, notice increased swelling or pain in lower back; begin to have difficulty moving lower extremities; or develop numbness to legs.     Driving or other activities under influence of medications - Patient and/or family/caretaker was given a prescription for, or instructed to use a medicine that may impair ability to drive, operate machinery, or participate in other potentially dangerous activities.  Patient was instructed not to participate in these activities while under the influence of these medications.      Pre-hypertension/Hypertension: The pt has been informed that they may have pre-hypertension or hypertension based on a blood pressure reading in the ED. I recommend that the pt call the PCP listed on their discharge instructions or a physician of their choice this week to arrange f/u for further evaluation of possible pre-hypertension or hypertension.     Keenan Marvin was given a handout which discussed their disease process, precautions, and instructions for follow-up and therapy.     Follow-up Information       Yennifer Lindo MD. Schedule an appointment as soon as possible for a visit in 1 week.    Specialty:  Pediatrics  Contact information:  [address] [facility] [address]  [place] [address]  [phone]               [facility] - Emergency Dept.    Specialty: Emergency Medicine  Why: As needed, If symptoms worsen  Contact information:  [address]  Emergency Department  [facility] [id number] [phone]                              Medication List        ASK your doctor about these medications      ALAHIST PE 2-7.5 mg Tab  Generic drug: dexbrompheniramine-phenyleph     drospirenone-ethinyl estradioL 3-0.03 mg per tablet  Commonly known as: DONTRELL  Take 1 tablet by mouth once daily.     EPINEPHrine 0.3 mg/0.3 mL Atin  Commonly known as: EPIPEN 2-OLIVER  Inject 0.3 mLs (0.3 mg total) into the muscle once. for 1 dose     hydroCHLOROthiazide 25 MG tablet  Commonly known as: HYDRODIURIL     hydrOXYzine pamoate 25 MG Cap  Commonly known as: VISTARIL     methocarbamoL 500 MG Tab  Commonly known as: ROBAXIN     naproxen 500 MG tablet  Commonly known as: NAPROSYN     ondansetron 8 MG Tbdl  Commonly known as: ZOFRAN-ODT  Take 1 tablet (8 mg total) by mouth every 8 (eight) hours as needed (nausea and vomiting).             Current Discharge Medication List            ED Diagnosis  1. Back strain, initial encounter    2. Left flank pain    3. Back spasm                            Clinical Impression:  Final diagnoses:  [R10.9] Left flank pain  [S39.012A] Back strain, initial encounter (Primary)  [M62.830] Back spasm          ED Disposition Condition    Discharge Stable          ED Prescriptions    None       Follow-up Information       Follow up With Specialties Details Why Contact Info    Yennifer Lindo MD Pediatrics Schedule an appointment as soon as possible for a visit in 1 week  [address] [facility] [address]  [place] [address]  [phone]      [facility] Emergency Dept Emergency Medicine  As needed, If symptoms worsen [address]  Emergency Department  [facility] [id number] [phone]              José Miguel Licona Jr., MD  09/06/24 0603

## 2024-09-07 NOTE — DISCHARGE INSTRUCTIONS
Take robaxin and naproxen as prescribed.  Return to ER if symptoms persist or worsen.    Regarding BACK PAIN, I advised patient to rest and slowly start to increase activity as pain decreases or as tolerable.  Also recommend the use of ice and heat. Explained to patient that ice will help decrease swelling and pain and prevent tissue damage (verbalized importance of covering ice with a towel and not applying it directly to skin and applying it for 20-30 minutes every 2 hours as needed). Further explained that heat will help decrease pain and muscle spasms (instructed patient to not fall asleep with heating pad and to apply heat to lower back for 20-30 minutes every 2 hours as needed). For prevention, I recommended that the patient use correct body movements (bend at the hips and knees when picking up objects and use leg muscles as you lift the load; keep objects close to chest when lifting it; and avoid twisting or lifting anything above the waist; change position often when standing for long periods of time; never reach, pull, or push while seated; exercise frequently and warm up before exercising; and maintain a healthy weight.  Follow up with primary care provider if no improvement is noticed in next three days.  Take all medications as prescribed and avoid operating heavy machinery or driving if prescribed pain medications or muscle relaxants.  Instructed patient to return to the emergency department if they develop incontinence, notice increased swelling or pain in lower back; begin to have difficulty moving lower extremities; or develop numbness to legs.     Driving or other activities under influence of medications - Patient and/or family/caretaker was given a prescription for, or instructed to use a medicine that may impair ability to drive, operate machinery, or participate in other potentially dangerous activities.  Patient was instructed not to participate in these activities while under the influence of  these medications.

## 2024-12-15 ENCOUNTER — HOSPITAL ENCOUNTER (EMERGENCY)
Facility: HOSPITAL | Age: 24
Discharge: HOME | End: 2024-12-15
Attending: EMERGENCY MEDICINE
Payer: COMMERCIAL

## 2024-12-15 VITALS
DIASTOLIC BLOOD PRESSURE: 72 MMHG | WEIGHT: 222.44 LBS | HEART RATE: 90 BPM | SYSTOLIC BLOOD PRESSURE: 142 MMHG | OXYGEN SATURATION: 100 % | HEIGHT: 63 IN | TEMPERATURE: 99 F | BODY MASS INDEX: 39.41 KG/M2 | RESPIRATION RATE: 20 BRPM

## 2024-12-15 DIAGNOSIS — R11.10 VOMITING, UNSPECIFIED VOMITING TYPE, UNSPECIFIED WHETHER NAUSEA PRESENT: ICD-10-CM

## 2024-12-15 DIAGNOSIS — R68.83 CHILLS: Primary | ICD-10-CM

## 2024-12-15 LAB
CTP QC/QA: YES
CTP QC/QA: YES
POC MOLECULAR INFLUENZA A AGN: NEGATIVE
POC MOLECULAR INFLUENZA B AGN: NEGATIVE
SARS-COV-2 RDRP RESP QL NAA+PROBE: NEGATIVE

## 2024-12-15 PROCEDURE — 99283 EMERGENCY DEPT VISIT LOW MDM: CPT | Mod: ER

## 2024-12-15 PROCEDURE — 87635 SARS-COV-2 COVID-19 AMP PRB: CPT | Mod: ER | Performed by: EMERGENCY MEDICINE

## 2024-12-15 PROCEDURE — 87502 INFLUENZA DNA AMP PROBE: CPT | Mod: ER

## 2024-12-15 RX ORDER — ONDANSETRON 4 MG/1
4 TABLET, ORALLY DISINTEGRATING ORAL EVERY 6 HOURS PRN
Qty: 12 TABLET | Refills: 0 | Status: SHIPPED | OUTPATIENT
Start: 2024-12-15

## 2024-12-15 NOTE — Clinical Note
"Keenan "Skinny Marvin was seen and treated in our emergency department on 12/15/2024.  She may return to work on 12/18/2024.       If you have any questions or concerns, please don't hesitate to call.      Denise Francisco, DO"

## 2024-12-15 NOTE — DISCHARGE INSTRUCTIONS
A cold is caused by a virus that can settle in your nose, throat or lungs. This causes a runny or stuffy nose and sneezing. You may also have a sore throat, cough, headache, fever and muscle aches. Different cold viruses last different lengths of time, but the average time is 2 to 14 days.     Seek immediate medical care if you develop: persistent high fever, chest pain, shortness of breath, severe headache, lethargy/confusion or worsening of your condition..     Treatment: There is no cure for the common cold, there is only symptomatic care. Antibiotics may be used to treat signs of a secondary infection, but they do not treat the cold virus.     Try these tips to keep yourself comfortable:  Get plenty of rest.  Drink plenty of fluids, at least 8 large glasses of fluid a day. Good fluid choices are water, fruit juices high in Vitamin C, tea, gelatin, or broths and soups. These help to keep mucus thin and ease congestion.  Use salt water gargle, cough drops or throat sprays to relieve throat pain. Mix ¼ to ½ teaspoon of salt in 1 cup of warm water for a salt water gargle  solution.  Use petroleum jelly or lip balm around lips and nose to prevent chapping  Use saline nose drops or spray to help ease congestion.    Over the Counter (OTC) Medicines:  Take over the counter medicines as needed to ease your signs.  Read labels carefully.  Use a product that treats only the signs that you have. Ask your pharmacist  for recommendations. Be sure to ask about possible interactions with other  medicines you are taking.    Common medicines used to treat cold symptoms include:  Flonase Nasal Spray daily.  Claritin or Zyrtec daily.  Mucinex every 12 hours -- Drink plenty of water while taking this medication.  Cough suppressant (also called antitussive), such as dextromethorphan.  This medicine decreases your reflex and sensitivity to cough. This  medicine may be kept behind the pharmacy counter for purchase.  Nasal Saline:   Use  as needed for dryness or congestion.  Afrin Nasal Spray: For severe nasal congestion.  Do not use for more than 3 days in a row.    Cold and cough medicines often contain more than one type of medicine.  Ask the pharmacist for help to confirm that you are not using more than one  product with the same or similar ingredient. For example, some cold and  cough medicines have acetaminophen or ibuprofen in them to help lower a  fever or ease muscle aches. Do not take extra acetaminophen (Tylenol) or  ibuprofen (Advil, Motrin) if the cold or cough medicine has it as an  ingredient. Too much medicine could be harmful.    Do not use these medications if you have an allergy to these medications or a medical condition that that could be worsened by taking them.   If pregnant, check with your obstetrician or pharmacists before taking.     Take the correct dose as listed on the package. Do not take more than  recommended.    Use a Humidifier:  A cool mist humidifier can make breathing easier by thinning mucus. Do not use a steam humidifier as hot water can cause burns if spilled.  Place the humidifier a few feet from the bed. Drain and clean each day withsoap and water to prevent bacteria and mold from growing.  Indoor humidity should not be above 50%. Stop using the humidifier if younotice moisture on windows, walls or pictures. You do not need to add any medicine to the humidifier.If you cannot get a humidifier, place a pan of water next to heating vents and refill the water level daily. The water will evaporate and add moisture to the room.    How to prevent the spread of colds  Wash your hands with soap and water or use alcohol based hand   often. Dry hands wet from washing with soap on a paper towel instead of cloth towel.  Cough or sneeze into your elbow to avoid spreading germs.  Wipe down common surfaces, such as door knobs and faucet handles, with a disinfectant spray.  Do not share cups or utensils.         Please follow up with your Primary care provider within 2-5 days if your signs and symptoms have not resolved or worsen.      If your condition worsens or fails to improve we recommend that you receive another evaluation at the emergency room immediately or contact your primary medical clinic to discuss your concerns.     You must understand that you have received an Emergency Care treatment only and that you may be released before all of your medical problems are known or treated. You, the patient, will arrange for follow up care as instructed.    Take frequent sips of Pedialyte, Powerade, Gatorade.  Popsicles.  Cochise diet, bananas, breads, rice.  Avoid red sauces, fruit juices, and dairy products as can irritate your stomach.  If drinking water, be sure to have something salty such as crackers to help restore electrolytes.  Return to emergency department for: Weakness, passing out, blood in stool, blood in vomit, fever, worsening abdominal pain, or other concerns.

## 2024-12-15 NOTE — ED PROVIDER NOTES
Emergency Medicine Provider Note - 12/15/2024       History     Chief Complaint   Patient presents with    Influenza     Cough, chills, and vomiting once since 9PM.        Allergies:  Review of patient's allergies indicates:   Allergen Reactions    Advair diskus [fluticasone propion-salmeterol]         History of Present Illness   HPI    12/15/2024, 6:45 AM  The history is provided by the patient    Keenan Marvin is a 24 y.o. female presenting to the ED for tickle in throat, sore chills, and 1 episode of emesis.  Patient has a history of ADHD, asthma, and ODT.  Patient is on Mounjaro for weight loss.  Patient went into work yesterday.  She had 1 bout of emesis.  She thought this may be secondary to the Mounjaro.  Earliest morning she started having a tickle in the back of the throat, and then began feeling hot and cold.  Patient currently feels cold in the emergency department.  She thought she might be running a fever.  Patient denies any diarrhea, dysuria, urgency, frequency, stomach pain, or chest pain.      Arrival mode: Private Vehicle     PCP: Haider Justin, CLAUDETTE     Past Medical History:  Past Medical History:   Diagnosis Date    ADHD (attention deficit hyperactivity disorder)     Asthma     ODD (oppositional defiant disorder)        Past Surgical History:  Past Surgical History:   Procedure Laterality Date    TYMPANOSTOMY TUBE PLACEMENT      WISDOM TOOTH EXTRACTION           Family History:  No family history on file.    Social History:  Social History     Tobacco Use    Smoking status: Every Day     Types: Vaping with nicotine    Smokeless tobacco: Never   Substance and Sexual Activity    Alcohol use: Yes     Comment: occas    Drug use: No    Sexual activity: Yes     Partners: Male     Birth control/protection: OCP       The Past Medical History, Social History, Surgical History and Family History was reviewed as documented above.     Review of Systems   Review of Systems   Constitutional:   Positive for chills. Negative for fever.   HENT:  Positive for sore throat. Negative for nosebleeds, postnasal drip and rhinorrhea.    Respiratory:  Negative for cough and shortness of breath.    Cardiovascular:  Negative for chest pain.   Gastrointestinal:  Positive for vomiting (X1, attributed to Mounjaro). Negative for abdominal pain and nausea.   Genitourinary:  Negative for dysuria.   Skin:  Negative for rash.   Neurological:  Negative for weakness.          Physical Exam     Initial Vitals [12/15/24 0607]   BP Pulse Resp Temp SpO2   (!) 142/72 90 20 98.5 °F (36.9 °C) 100 %      MAP       --          Physical Exam    Nursing Notes and Vital Signs Reviewed.  Constitutional: Patient is in no apparent distress. Well-developed and well-nourished.  Head: Atraumatic. Normocephalic.  Eyes: PERRL. EOM intact. Conjunctivae are not pale. No scleral icterus.  ENT: Mucous membranes are moist. Oropharynx is clear and symmetric.  Tympanic membranes pearly gray bilaterally.  Uvula midline.  There is hyperemia of the nasal mucosa.  Clear nasal drainage.  Neck: Supple. Full ROM. No lymphadenopathy.  No meningismus  Cardiovascular: Regular rate. Regular rhythm. No murmurs, rubs, or gallops. Distal pulses are 2+ and symmetric.  Pulmonary/Chest: No respiratory distress. Clear to auscultation bilaterally. No wheezing or rales.  Abdominal: Soft and non-distended.  There is no tenderness.  No rebound, guarding, or rigidity. Good bowel sounds.  Musculoskeletal: Moves all extremities. No obvious deformities. No edema. No calf tenderness.  Genitourinary:   Skin: Warm and dry.  Neurological:  Alert, awake, and appropriate.  Normal speech.  No acute focal neurological deficits are appreciated.  Psychiatric: Normal affect. Good eye contact. Appropriate in content.     ED Course   ED Procedures:  Procedures    ED Vital Signs:  Vitals:    12/15/24 0607   BP: (!) 142/72   Pulse: 90   Resp: 20   Temp: 98.5 °F (36.9 °C)   TempSrc: Oral   SpO2:  "100%   Weight: 100.9 kg (222 lb 7.1 oz)   Height: 5' 3" (1.6 m)       All Lab Results:  Results for orders placed or performed during the hospital encounter of 12/15/24   POCT Influenza A/B Molecular    Collection Time: 12/15/24  6:29 AM   Result Value Ref Range    POC Molecular Influenza A Ag Negative Negative    POC Molecular Influenza B Ag Negative Negative     Acceptable Yes    POCT COVID-19 Rapid Screening    Collection Time: 12/15/24  6:29 AM   Result Value Ref Range    POC Rapid COVID Negative Negative     Acceptable Yes          Imaging Results:  Imaging Results    None               The Emergency Provider reviewed the vital signs and test results, which are outlined above.     ED Discussion   ED Medication(s):  Medications - No data to display                      6:46 AM  Reassessment: Dr. Francisco reassessed the pt.  The pt is resting comfortably and is NAD.  Pt states their sx have improved. Discussed test results, shared treatment plan, specific conditions for return, and the need for f/u.  Answered their questions at this time.  Pt understands and agrees to the plan.  The pt has remained hemodynamically stable through ED course and is stable for discharge.    I discussed with patient and/or family/caretaker that evaluation in the ED does not suggest any emergent or life threatening medical conditions requiring immediate intervention beyond what was provided in the ED, and I believe patient is safe for discharge.  Regardless, an unremarkable evaluation in the ED does not preclude the development or presence of a serious of life threatening condition. As such, patient was instructed to return immediately for any worsening or change in current symptoms.    A cold is caused by a virus that can settle in your nose, throat or lungs. This causes a runny or stuffy nose and sneezing. You may also have a sore throat, cough, headache, fever and muscle aches. Different cold viruses " last different lengths of time, but the average time is 2 to 14 days.     Seek immediate medical care if you develop: persistent high fever, chest pain, shortness of breath, severe headache, lethargy/confusion or worsening of your condition..     Treatment: There is no cure for the common cold, there is only symptomatic care. Antibiotics may be used to treat signs of a secondary infection, but they do not treat the cold virus.     Try these tips to keep yourself comfortable:  Get plenty of rest.  Drink plenty of fluids, at least 8 large glasses of fluid a day. Good fluid choices are water, fruit juices high in Vitamin C, tea, gelatin, or broths and soups. These help to keep mucus thin and ease congestion.  Use salt water gargle, cough drops or throat sprays to relieve throat pain. Mix ¼ to ½ teaspoon of salt in 1 cup of warm water for a salt water gargle  solution.  Use petroleum jelly or lip balm around lips and nose to prevent chapping  Use saline nose drops or spray to help ease congestion.    Over the Counter (OTC) Medicines:  Take over the counter medicines as needed to ease your signs.  Read labels carefully.  Use a product that treats only the signs that you have. Ask your pharmacist  for recommendations. Be sure to ask about possible interactions with other  medicines you are taking.    Common medicines used to treat cold symptoms include:  Flonase Nasal Spray daily.  Claritin or Zyrtec daily.  Mucinex every 12 hours -- Drink plenty of water while taking this medication.  Cough suppressant (also called antitussive), such as dextromethorphan.  This medicine decreases your reflex and sensitivity to cough. This  medicine may be kept behind the pharmacy counter for purchase.  Nasal Saline:   Use as needed for dryness or congestion.  Afrin Nasal Spray: For severe nasal congestion.  Do not use for more than 3 days in a row.    Cold and cough medicines often contain more than one type of medicine.  Ask the  pharmacist for help to confirm that you are not using more than one  product with the same or similar ingredient. For example, some cold and  cough medicines have acetaminophen or ibuprofen in them to help lower a  fever or ease muscle aches. Do not take extra acetaminophen (Tylenol) or  ibuprofen (Advil, Motrin) if the cold or cough medicine has it as an  ingredient. Too much medicine could be harmful.    Do not use these medications if you have an allergy to these medications or a medical condition that that could be worsened by taking them.   If pregnant, check with your obstetrician or pharmacists before taking.     Take the correct dose as listed on the package. Do not take more than  recommended.    Use a Humidifier:  A cool mist humidifier can make breathing easier by thinning mucus. Do not use a steam humidifier as hot water can cause burns if spilled.  Place the humidifier a few feet from the bed. Drain and clean each day withsoap and water to prevent bacteria and mold from growing.  Indoor humidity should not be above 50%. Stop using the humidifier if younotice moisture on windows, walls or pictures. You do not need to add any medicine to the humidifier.If you cannot get a humidifier, place a pan of water next to heating vents and refill the water level daily. The water will evaporate and add moisture to the room.    How to prevent the spread of colds  Wash your hands with soap and water or use alcohol based hand   often. Dry hands wet from washing with soap on a paper towel instead of cloth towel.  Cough or sneeze into your elbow to avoid spreading germs.  Wipe down common surfaces, such as door knobs and faucet handles, with a disinfectant spray.  Do not share cups or utensils.        Please follow up with your Primary care provider within 2-5 days if your signs and symptoms have not resolved or worsen.      If your condition worsens or fails to improve we recommend that you receive another  evaluation at the emergency room immediately or contact your primary medical clinic to discuss your concerns.     You must understand that you have received an Emergency Care treatment only and that you may be released before all of your medical problems are known or treated. You, the patient, will arrange for follow up care as instructed.    Take frequent sips of Pedialyte, Powerade, Gatorade.  Popsicles.  Castro diet, bananas, breads, rice.  Avoid red sauces, fruit juices, and dairy products as can irritate your stomach.  If drinking water, be sure to have something salty such as crackers to help restore electrolytes.  Return to emergency department for: Weakness, passing out, blood in stool, blood in vomit, fever, worsening abdominal pain, or other concerns.       MIPS Measures     Smoker? Yes     Hypertension: Blood pressure was > 120/80.  Patient was informed that they may be developing hypertension.  They were advised to keep a log of their blood pressure and follow up with their primary care physician.     Medical Decision Making                 Medical Decision Making  Differential diagnosis: COVID, influenza, viral URI    ED course: COVID influenza negative.    Amount and/or Complexity of Data Reviewed  Labs: ordered. Decision-making details documented in ED Course.    Risk  Prescription drug management.  Risk Details: Discharge Medication List as of 12/15/2024  6:45 AM    START taking these medications    ondansetron (ZOFRAN-ODT) 4 MG TbDL  Take 1 tablet (4 mg total) by mouth every 6 (six) hours as needed., Starting Sun 12/15/2024, Normal                Coding    Referrals:  No orders of the defined types were placed in this encounter.      Prescription Management: I performed a review of the patient's current Rx medication list as input by nursing staff.    Patient's Medications   New Prescriptions    ONDANSETRON (ZOFRAN-ODT) 4 MG TBDL    Take 1 tablet (4 mg total) by mouth every 6 (six) hours as needed.  "  Previous Medications    EPINEPHRINE (EPIPEN 2-OLIVER) 0.3 MG/0.3 ML ATIN    Inject 0.3 mLs (0.3 mg total) into the muscle once. for 1 dose    TIRZEPATIDE 7.5 MG/0.5 ML PNIJ    Inject 7.5 mg into the skin.   Modified Medications    No medications on file   Discontinued Medications    ALAHIST PE 2-7.5 MG TAB    Take 1 tablet by mouth 3 (three) times daily.    DROSPIRENONE-ETHINYL ESTRADIOL (DONTRELL) 3-0.03 MG PER TABLET    Take 1 tablet by mouth once daily.    HYDROCHLOROTHIAZIDE (HYDRODIURIL) 25 MG TABLET    TAKE 1 TABLET BY MOUTH IN THE MORNING FOR HYPERTENSION    HYDROXYZINE PAMOATE (VISTARIL) 25 MG CAP    Take 25 mg by mouth.    METHOCARBAMOL (ROBAXIN) 500 MG TAB    Take 500 mg by mouth 3 (three) times daily.    NAPROXEN (NAPROSYN) 500 MG TABLET    Take 500 mg by mouth 2 (two) times daily.    ONDANSETRON (ZOFRAN-ODT) 8 MG TBDL    Take 1 tablet (8 mg total) by mouth every 8 (eight) hours as needed (nausea and vomiting).        Discussed case verbally with:       Portions of this note may have been created with voice recognition software. Occasional "wrong-word" or "sound-a-like" substitutions may have occurred due to the inherent limitations of voice recognition software. Please, read the note carefully and recognize, using context, where substitutions have occurred.          Clinical Impression       ICD-10-CM ICD-9-CM   1. Chills  R68.83 780.64   2. Vomiting, unspecified vomiting type, unspecified whether nausea present  R11.10 787.03        Disposition        Disposition: Discharge to home  Patient condition: Stable    ED Follow-up      Follow-up Information       Haider Justin, FNP-C.    Specialty: Family Medicine  Why: Return to emergency department for: Severe stomach pain, fever, chest pain, chest pressure, difficulty swallowing, worsening weakness, other concerns  Contact information:  [address] [place] [id number]  [phone]                                          Denise Francisco " TERRENCE,   12/15/24 0658

## 2025-04-16 ENCOUNTER — HOSPITAL ENCOUNTER (EMERGENCY)
Facility: HOSPITAL | Age: 25
Discharge: HOME | End: 2025-04-16
Attending: EMERGENCY MEDICINE
Payer: COMMERCIAL

## 2025-04-16 VITALS
OXYGEN SATURATION: 98 % | WEIGHT: 205.5 LBS | HEIGHT: 63 IN | HEART RATE: 96 BPM | TEMPERATURE: 98 F | SYSTOLIC BLOOD PRESSURE: 134 MMHG | RESPIRATION RATE: 16 BRPM | DIASTOLIC BLOOD PRESSURE: 69 MMHG | BODY MASS INDEX: 36.41 KG/M2

## 2025-04-16 DIAGNOSIS — N30.00 ACUTE CYSTITIS WITHOUT HEMATURIA: Primary | ICD-10-CM

## 2025-04-16 LAB
B-HCG UR QL: NEGATIVE
BACTERIA #/AREA URNS HPF: ABNORMAL /HPF
BILIRUB UR QL STRIP.AUTO: ABNORMAL
CLARITY UR: CLEAR
COLOR UR AUTO: ABNORMAL
GLUCOSE UR QL STRIP: ABNORMAL
HGB UR QL STRIP: ABNORMAL
HOLD SPECIMEN: NORMAL
KETONES UR QL STRIP: ABNORMAL
LEUKOCYTE ESTERASE UR QL STRIP: ABNORMAL
MICROSCOPIC COMMENT: ABNORMAL
NITRITE UR QL STRIP: ABNORMAL
PH UR STRIP: ABNORMAL [PH]
PROT UR QL STRIP: ABNORMAL
RBC #/AREA URNS HPF: 1 /HPF (ref 0–4)
SP GR UR STRIP: ABNORMAL
SQUAMOUS #/AREA URNS HPF: 4 /HPF
UROBILINOGEN UR STRIP-ACNC: ABNORMAL EU/DL
WBC #/AREA URNS HPF: 7 /HPF (ref 0–5)

## 2025-04-16 PROCEDURE — 87086 URINE CULTURE/COLONY COUNT: CPT | Performed by: EMERGENCY MEDICINE

## 2025-04-16 PROCEDURE — 99283 EMERGENCY DEPT VISIT LOW MDM: CPT | Mod: ER

## 2025-04-16 PROCEDURE — 81003 URINALYSIS AUTO W/O SCOPE: CPT | Performed by: EMERGENCY MEDICINE

## 2025-04-16 PROCEDURE — 81025 URINE PREGNANCY TEST: CPT | Performed by: EMERGENCY MEDICINE

## 2025-04-16 RX ORDER — CEFDINIR 300 MG/1
300 CAPSULE ORAL 2 TIMES DAILY
Qty: 14 CAPSULE | Refills: 0 | Status: SHIPPED | OUTPATIENT
Start: 2025-04-16 | End: 2025-04-23

## 2025-04-16 NOTE — ED PROVIDER NOTES
"Encounter Date: 4/16/2025       History     Chief Complaint   Patient presents with    Urinary Tract Infection     Concerned with UTI     24-year-old female presents with symptoms of a UTI.  She reports "heavy urination" x1 week.  She denies any hematuria, abdominal pain, flank pain, nausea, vomiting, diarrhea, fevers, vaginal bleeding or discharge.  She has been taking over-the-counter azo.         Review of patient's allergies indicates:   Allergen Reactions    Advair diskus [fluticasone propion-salmeterol]      Past Medical History:   Diagnosis Date    ADHD (attention deficit hyperactivity disorder)     Asthma     ODD (oppositional defiant disorder)      Past Surgical History:   Procedure Laterality Date    TYMPANOSTOMY TUBE PLACEMENT      WISDOM TOOTH EXTRACTION       No family history on file.  Social History[1]  Review of Systems   Constitutional:  Negative for fever.   Gastrointestinal:  Negative for abdominal pain, constipation, diarrhea, nausea and vomiting.   Genitourinary:  Positive for dysuria. Negative for flank pain, pelvic pain, vaginal bleeding and vaginal discharge.   Musculoskeletal:  Negative for back pain.       Physical Exam     Initial Vitals [04/16/25 0653]   BP Pulse Resp Temp SpO2   134/69 96 16 98.3 °F (36.8 °C) 98 %      MAP       --         Physical Exam    Vitals reviewed.  Constitutional: She appears well-developed and well-nourished. She is not diaphoretic. No distress.   Cardiovascular:  Normal rate, regular rhythm and normal heart sounds.           Pulmonary/Chest: Breath sounds normal. No respiratory distress. She has no wheezes. She has no rhonchi. She has no rales.   Abdominal: Abdomen is soft. Bowel sounds are normal. She exhibits no distension and no mass. There is no abdominal tenderness.   No right CVA tenderness.  No left CVA tenderness. There is no rebound and no guarding.   Musculoskeletal:         General: Normal range of motion.     Neurological: She is alert and oriented " to person, place, and time.   Skin: Skin is warm and dry.         ED Course   Procedures  Labs Reviewed   URINALYSIS, REFLEX TO URINE CULTURE - Abnormal       Result Value    Color, UA Orange (*)     Appearance, UA Clear      pH, UA Color may interfere with results      Spec Grav UA Color may interfere with results      Protein, UA Color may interfere with results      Glucose, UA Color may interfere with results      Ketones, UA Color may interfere with results      Bilirubin, UA Color may interfere with results      Blood, UA Color may interfere with results      Nitrites, UA Color may interfere with results      Urobilinogen, UA Color may interfere with results      Leukocyte Esterase, UA Color may interfere with results     URINALYSIS MICROSCOPIC - Abnormal    RBC, UA 1      WBC, UA 7 (*)     Bacteria, UA Rare      Squamous Epithelial Cells, UA 4      Microscopic Comment mucus     PREGNANCY TEST, URINE RAPID - Normal    hCG Qualitative, Urine Negative     CULTURE, URINE   GREY TOP URINE HOLD          Imaging Results    None          Medications - No data to display  Medical Decision Making  24-year-old female presents with a UTI symptoms.  However, her urine results are complicated by the fact that she has been taking azo over-the-counter.  No previous urine cultures for comparison.  Micro not overtly concerning for severe infection.  No symptoms to suggest pyelonephritis.  There are WBC and bacteria present.  Pregnancy is negative.  She denies any vaginal symptoms to suggest PID.  Urine culture sent and start a course of Omnicef.          Risk  Prescription drug management.      Additional MDM:   Differential Diagnosis:   UTI, interstitial cystitis, cervicitis, vaginitis                                    Clinical Impression:  Final diagnoses:  [N30.00] Acute cystitis without hematuria (Primary)          ED Disposition Condition    Discharge Stable          ED Prescriptions       Medication Sig Dispense Start  Date End Date Auth. Provider    cefdinir (OMNICEF) 300 MG capsule Take 1 capsule (300 mg total) by mouth 2 (two) times daily. for 7 days 14 capsule 4/16/2025 4/23/2025 Jumana Salazar DO          Follow-up Information       Follow up With Specialties Details Why Contact Info    Haider Justin, FNP-C Family Medicine In 1 week  [address] [facility] [address]  [phone]      [facility] - Emergency Dept Emergency Medicine  As needed, If symptoms worsen [address]  Emergency Department  [facility] [id number] [phone]                 [1]   Social History  Tobacco Use    Smoking status: Every Day     Types: Vaping with nicotine    Smokeless tobacco: Never   Substance Use Topics    Alcohol use: Yes     Comment: occas    Drug use: No        Jumana Salazar DO  04/16/25 0813

## 2025-06-18 ENCOUNTER — PATIENT MESSAGE (OUTPATIENT)
Dept: RESEARCH | Facility: HOSPITAL | Age: 25
End: 2025-06-18
Payer: COMMERCIAL